# Patient Record
Sex: MALE | Race: OTHER | Employment: OTHER | URBAN - METROPOLITAN AREA
[De-identification: names, ages, dates, MRNs, and addresses within clinical notes are randomized per-mention and may not be internally consistent; named-entity substitution may affect disease eponyms.]

---

## 2018-06-27 NOTE — PATIENT DISCUSSION
Nonproliferative Diabetic Retinopathy Counseling:  I have discussed with the patient the importance of controlling blood glucose to minimize the risk of progressing retinal complications from diabetes. I explained the importance of annual dilated eye exams. Return for follow-up as scheduled.

## 2019-01-18 NOTE — PATIENT DISCUSSION
Retinal pigmented Epithelial detachment Counseling: The patient was advised to call the office if new symptoms of persistent blurring or distortion of vision arise as evaluation and possible treatment is necessary to preserve as much vision as possible. Return for follow-up as scheduled.

## 2019-01-18 NOTE — PATIENT DISCUSSION
RETINA IS ATTACHED OU: SYNERESIS OU; MACULAR PED OD; NO HOLES OR TEARS SEEN ON DILATED EXAM TODAY.  RETINAL DETACHMENT SIGNS AND SYMPTOMS REVIEWED

## 2019-01-18 NOTE — PATIENT DISCUSSION
NONPROLIFERATIVE DIABETIC RETINOPATHY, OU:  STABLE. NO CSDME OU.  RETURN FOR FOLLOW-UP AS SCHEDULED FOR DILATED EYE EXAM.

## 2020-05-21 ENCOUNTER — NEW PATIENT EMERGENCY (OUTPATIENT)
Dept: URBAN - METROPOLITAN AREA CLINIC 36 | Facility: CLINIC | Age: 40
End: 2020-05-21

## 2020-05-21 DIAGNOSIS — H10.812: ICD-10-CM

## 2020-05-21 PROCEDURE — 92002 INTRM OPH EXAM NEW PATIENT: CPT

## 2020-05-21 RX ORDER — PREDNISOLONE ACETATE 10 MG/ML: 1 SUSPENSION/ DROPS OPHTHALMIC

## 2020-05-21 ASSESSMENT — TONOMETRY
OD_IOP_MMHG: 13
OS_IOP_MMHG: 13

## 2020-05-21 ASSESSMENT — VISUAL ACUITY
OD_SC: 20/25
OS_SC: 20/20

## 2022-05-06 ENCOUNTER — COMPREHENSIVE EXAM (OUTPATIENT)
Dept: URBAN - METROPOLITAN AREA CLINIC 36 | Facility: CLINIC | Age: 42
End: 2022-05-06

## 2022-05-06 DIAGNOSIS — H43.811: ICD-10-CM

## 2022-05-06 DIAGNOSIS — H52.7: ICD-10-CM

## 2022-05-06 DIAGNOSIS — Z98.890: ICD-10-CM

## 2022-05-06 PROCEDURE — 92014 COMPRE OPH EXAM EST PT 1/>: CPT

## 2022-05-06 PROCEDURE — 92015 DETERMINE REFRACTIVE STATE: CPT

## 2022-05-06 ASSESSMENT — VISUAL ACUITY
OS_SC: J1
OU_SC: 20/20-1
OD_SC: 20/25-1
OU_SC: J1+
OD_SC: J1
OS_SC: 20/20-2

## 2022-05-06 ASSESSMENT — TONOMETRY
OD_IOP_MMHG: 14
OS_IOP_MMHG: 14

## 2024-06-21 ENCOUNTER — COMPREHENSIVE EXAM (OUTPATIENT)
Dept: URBAN - METROPOLITAN AREA CLINIC 36 | Facility: CLINIC | Age: 44
End: 2024-06-21

## 2024-06-21 DIAGNOSIS — Q14.2: ICD-10-CM

## 2024-06-21 DIAGNOSIS — H52.7: ICD-10-CM

## 2024-06-21 DIAGNOSIS — Z98.890: ICD-10-CM

## 2024-06-21 DIAGNOSIS — H43.811: ICD-10-CM

## 2024-06-21 PROCEDURE — 92012 INTRM OPH EXAM EST PATIENT: CPT

## 2024-06-21 PROCEDURE — 92015 DETERMINE REFRACTIVE STATE: CPT

## 2024-06-21 PROCEDURE — 92250E RETINAL SCREENING, ELECTIVE

## 2024-06-21 ASSESSMENT — VISUAL ACUITY
OD_SC: J4
OS_SC: J1
OD_SC: 20/25
OS_SC: 20/25-1

## 2024-06-21 ASSESSMENT — TONOMETRY
OS_IOP_MMHG: 18
OD_IOP_MMHG: 18